# Patient Record
Sex: MALE | Race: WHITE | Employment: FULL TIME | URBAN - METROPOLITAN AREA
[De-identification: names, ages, dates, MRNs, and addresses within clinical notes are randomized per-mention and may not be internally consistent; named-entity substitution may affect disease eponyms.]

---

## 2019-10-31 ENCOUNTER — LAB ENCOUNTER (OUTPATIENT)
Dept: LAB | Age: 34
End: 2019-10-31
Attending: INTERNAL MEDICINE
Payer: COMMERCIAL

## 2019-10-31 ENCOUNTER — OFFICE VISIT (OUTPATIENT)
Dept: INTERNAL MEDICINE CLINIC | Facility: CLINIC | Age: 34
End: 2019-10-31
Payer: COMMERCIAL

## 2019-10-31 VITALS
OXYGEN SATURATION: 99 % | HEART RATE: 80 BPM | WEIGHT: 192 LBS | HEIGHT: 68 IN | SYSTOLIC BLOOD PRESSURE: 118 MMHG | DIASTOLIC BLOOD PRESSURE: 86 MMHG | RESPIRATION RATE: 16 BRPM | BODY MASS INDEX: 29.1 KG/M2 | TEMPERATURE: 98 F

## 2019-10-31 DIAGNOSIS — R10.9 ABDOMINAL PAIN, UNSPECIFIED ABDOMINAL LOCATION: ICD-10-CM

## 2019-10-31 DIAGNOSIS — Z00.00 PHYSICAL EXAM: Primary | ICD-10-CM

## 2019-10-31 DIAGNOSIS — M54.9 CHRONIC BACK PAIN, UNSPECIFIED BACK LOCATION, UNSPECIFIED BACK PAIN LATERALITY: ICD-10-CM

## 2019-10-31 DIAGNOSIS — Z00.00 PHYSICAL EXAM: ICD-10-CM

## 2019-10-31 DIAGNOSIS — G89.29 CHRONIC BACK PAIN, UNSPECIFIED BACK LOCATION, UNSPECIFIED BACK PAIN LATERALITY: ICD-10-CM

## 2019-10-31 PROCEDURE — 80061 LIPID PANEL: CPT

## 2019-10-31 PROCEDURE — 84443 ASSAY THYROID STIM HORMONE: CPT

## 2019-10-31 PROCEDURE — 80053 COMPREHEN METABOLIC PANEL: CPT

## 2019-10-31 PROCEDURE — 90471 IMMUNIZATION ADMIN: CPT | Performed by: INTERNAL MEDICINE

## 2019-10-31 PROCEDURE — 90686 IIV4 VACC NO PRSV 0.5 ML IM: CPT | Performed by: INTERNAL MEDICINE

## 2019-10-31 PROCEDURE — 99385 PREV VISIT NEW AGE 18-39: CPT | Performed by: INTERNAL MEDICINE

## 2019-10-31 PROCEDURE — 81003 URINALYSIS AUTO W/O SCOPE: CPT

## 2019-10-31 PROCEDURE — 85025 COMPLETE CBC W/AUTO DIFF WBC: CPT

## 2019-10-31 PROCEDURE — 36415 COLL VENOUS BLD VENIPUNCTURE: CPT

## 2019-10-31 RX ORDER — DICYCLOMINE HCL 20 MG
20 TABLET ORAL 3 TIMES DAILY PRN
Qty: 60 TABLET | Refills: 1 | Status: SHIPPED | OUTPATIENT
Start: 2019-10-31 | End: 2020-01-15

## 2019-10-31 RX ORDER — FEXOFENADINE HCL AND PSEUDOEPHEDRINE HCI 60; 120 MG/1; MG/1
1 TABLET, EXTENDED RELEASE ORAL DAILY PRN
COMMUNITY

## 2019-10-31 NOTE — PROGRESS NOTES
HPI:    Patient ID: Juliana Harper is a 29year old male. Patient presents for physical.    He has not been seen since November 2016. Prior to that time, patient was having issues of chronic recurrent abdominal pain.   Patient noted significant left-s Alcohol use: Yes        Comment: Occasional      Drug use: No        Review of Systems   Constitutional: Negative for activity change, chills and fever. HENT: Negative for congestion, facial swelling, nosebleeds, rhinorrhea and sinus pain.     Eyes: Nega reactive to light. Conjunctivae and EOM are normal.   Neck: Neck supple. No JVD present. No thyromegaly present. Cardiovascular: Normal rate, regular rhythm and normal heart sounds.    Pulmonary/Chest: Effort normal and breath sounds normal. No respirator strengthening/stretching exercises. We will check routine labs. Patient will get flu shot today. We will arrange follow-up after gastroenterology evaluation.       Orders Placed This Encounter      Urinalysis, Routine [E]      Comp Metabolic Panel

## 2019-11-01 ENCOUNTER — TELEPHONE (OUTPATIENT)
Dept: INTERNAL MEDICINE CLINIC | Facility: CLINIC | Age: 34
End: 2019-11-01

## 2020-01-13 ENCOUNTER — TELEPHONE (OUTPATIENT)
Dept: INTERNAL MEDICINE CLINIC | Facility: CLINIC | Age: 35
End: 2020-01-13

## 2020-01-13 NOTE — TELEPHONE ENCOUNTER
To Dr. Rodrigo Welsh---    Not in records as being prescribed for patient previously. Pended for review.

## 2020-01-15 RX ORDER — DICYCLOMINE HCL 20 MG
20 TABLET ORAL 3 TIMES DAILY PRN
Qty: 60 TABLET | Refills: 1 | Status: SHIPPED | OUTPATIENT
Start: 2020-01-15 | End: 2021-05-04 | Stop reason: ALTCHOICE

## 2020-01-15 NOTE — TELEPHONE ENCOUNTER
Please advise - cuco prince who states he has larisa with GI late February , still has slight abdominal pain and is taking dicyclomine - refill pending thanks

## 2020-02-26 ENCOUNTER — OFFICE VISIT (OUTPATIENT)
Dept: GASTROENTEROLOGY | Facility: CLINIC | Age: 35
End: 2020-02-26
Payer: COMMERCIAL

## 2020-02-26 VITALS
DIASTOLIC BLOOD PRESSURE: 88 MMHG | BODY MASS INDEX: 27.89 KG/M2 | WEIGHT: 184 LBS | HEIGHT: 68 IN | SYSTOLIC BLOOD PRESSURE: 137 MMHG | HEART RATE: 73 BPM

## 2020-02-26 DIAGNOSIS — K59.04 CHRONIC IDIOPATHIC CONSTIPATION: ICD-10-CM

## 2020-02-26 DIAGNOSIS — R10.9 LEFT SIDED ABDOMINAL PAIN: Primary | ICD-10-CM

## 2020-02-26 DIAGNOSIS — R14.0 ABDOMINAL BLOATING: ICD-10-CM

## 2020-02-26 PROCEDURE — 99243 OFF/OP CNSLTJ NEW/EST LOW 30: CPT | Performed by: INTERNAL MEDICINE

## 2020-02-26 RX ORDER — POLYETHYLENE GLYCOL 3350 17 G/17G
17 POWDER, FOR SOLUTION ORAL AS NEEDED
COMMUNITY

## 2020-02-26 NOTE — PROGRESS NOTES
HPI:    Patient ID: Yoshi Griffiths is a fit and healthy 29year old man who is referred by Dr. Daisy Wynne regarding several years of left-sided abdominal pain. As below, a CT scan was performed for this concern back in October 2016.   Dr. Darwin Ortiz notes with domi Normal     KIDNEYS URETERS AND BLADDER: Normal     PELVIC ORGANS: No abnormal pelvic masses     ABDOMINAL AORTA: The aorta is normal in course and caliber.     LYMPH NODES: There is no evidence of mesenteric, retroperitoneal or inguinal adenopathy.     BOW 10/31/2019:  Normal CBC  AST 21 ALT 37 alkaline phosphatase 59  Albumin 4.4  TSH 1.31      Fit and healthy 29year-old gentleman with several years of left-sided abdominal pain and gas, bloating symptom.   Reassuring CT scan for the symptoms late 2016 as ab with dark red lid) - [employer]/website, [employer] Website only, [employer] heaping tablespoon in water or juice once or twice per day; or    -  Benefiber (same) -- artificial/less bloating  -  Citrucel powder or tabs  -  E[employer]'s Tummy Fiber - (internet)

## 2021-05-04 ENCOUNTER — OFFICE VISIT (OUTPATIENT)
Dept: INTERNAL MEDICINE CLINIC | Facility: CLINIC | Age: 36
End: 2021-05-04
Payer: COMMERCIAL

## 2021-05-04 VITALS
BODY MASS INDEX: 26 KG/M2 | WEIGHT: 170 LBS | HEART RATE: 82 BPM | OXYGEN SATURATION: 99 % | DIASTOLIC BLOOD PRESSURE: 76 MMHG | RESPIRATION RATE: 14 BRPM | TEMPERATURE: 98 F | SYSTOLIC BLOOD PRESSURE: 132 MMHG

## 2021-05-04 DIAGNOSIS — K52.9 GASTROENTERITIS: Primary | ICD-10-CM

## 2021-05-04 PROCEDURE — 99213 OFFICE O/P EST LOW 20 MIN: CPT | Performed by: INTERNAL MEDICINE

## 2021-05-04 PROCEDURE — 3078F DIAST BP <80 MM HG: CPT | Performed by: INTERNAL MEDICINE

## 2021-05-04 PROCEDURE — 3075F SYST BP GE 130 - 139MM HG: CPT | Performed by: INTERNAL MEDICINE

## 2021-05-04 NOTE — PROGRESS NOTES
HPI:    Patient ID: Matthew Boxer is a 28year old male. 8 days ago, developed nausea associated with diarrhea and cramps, decreased po intake. Took immodium with some help; however then developed constipation. Covid testing 6 days ago negative.   Jose Rodriguez tenderness or guarding. Musculoskeletal:      Right lower leg: No edema. Left lower leg: No edema. Neurological:      Mental Status: He is alert.    Psychiatric:         Mood and Affect: Mood normal.                ASSESSMENT/PLAN:   Gastroenteriti

## 2022-02-02 ENCOUNTER — TELEPHONE (OUTPATIENT)
Dept: INTERNAL MEDICINE CLINIC | Facility: CLINIC | Age: 37
End: 2022-02-02

## 2022-02-02 NOTE — TELEPHONE ENCOUNTER
Pt reports increased chest congestion, productive and occasional dry cough, dark green mucus, chills, diarrhea that began 3 days ago. Denies SOB at rest. Advised immediate care for evaluation and for any pertinent possible imaging/testing. Pt verbalized understanding and agrees with plan. Pt states he plans to go to [id number] [facility] [place],[address].

## 2022-02-02 NOTE — TELEPHONE ENCOUNTER
Patient calling for appointment today with Dr. Katheryn Mendes. Has cold like symptoms for about three days. Symptoms have been getting worse. Cough up dark green, chest congestion. Cough is dry at times. This morning woke up with chills and hot flashes. This has resolved. Now has diarrhea this morning.   No fever    [facility] in [place]    No known exposure to Covid  Patient is vaccinated    Best call back number [phone]

## 2022-02-03 ENCOUNTER — HOSPITAL ENCOUNTER (OUTPATIENT)
Age: 37
Discharge: HOME | End: 2022-02-03
Payer: COMMERCIAL

## 2022-02-03 VITALS
DIASTOLIC BLOOD PRESSURE: 82 MMHG | RESPIRATION RATE: 16 BRPM | OXYGEN SATURATION: 98 % | BODY MASS INDEX: 25.92 KG/M2 | HEIGHT: 69 IN | TEMPERATURE: 99 F | SYSTOLIC BLOOD PRESSURE: 131 MMHG | HEART RATE: 79 BPM | WEIGHT: 175 LBS

## 2022-02-03 DIAGNOSIS — R05.9 COUGH: ICD-10-CM

## 2022-02-03 DIAGNOSIS — Z20.822 ENCOUNTER FOR LABORATORY TESTING FOR COVID-19 VIRUS: Primary | ICD-10-CM

## 2022-02-03 LAB — SARS-COV-2 RNA RESP QL NAA+PROBE: NOT DETECTED

## 2022-02-03 PROCEDURE — U0002 COVID-19 LAB TEST NON-CDC: HCPCS | Performed by: NURSE PRACTITIONER

## 2022-02-03 PROCEDURE — 99213 OFFICE O/P EST LOW 20 MIN: CPT | Performed by: NURSE PRACTITIONER

## 2022-02-03 RX ORDER — ALBUTEROL SULFATE 90 UG/1
2 AEROSOL, METERED RESPIRATORY (INHALATION) EVERY 4 HOURS PRN
Qty: 1 EACH | Refills: 0 | Status: SHIPPED | OUTPATIENT
Start: 2022-02-03 | End: 2022-03-05

## 2022-02-03 RX ORDER — AZITHROMYCIN 250 MG/1
TABLET, FILM COATED ORAL
Qty: 6 TABLET | Refills: 0 | Status: SHIPPED | OUTPATIENT
Start: 2022-02-03 | End: 2022-02-08

## 2022-02-03 NOTE — ED INITIAL ASSESSMENT (HPI)
Pt presents to the [facility] with c/o a sore throat, cough and chest pressure since last Saturday. No nasal congestion or fevers.

## 2022-02-03 NOTE — TELEPHONE ENCOUNTER
Triage RN following up on UC visit. It appears pt was seen in [facility] this morning and provided with zpak and albuterol inhaler. Nothing further in this encounter.

## 2022-05-05 ENCOUNTER — TELEPHONE (OUTPATIENT)
Dept: INTERNAL MEDICINE CLINIC | Facility: CLINIC | Age: 37
End: 2022-05-05

## 2022-05-05 NOTE — TELEPHONE ENCOUNTER
Please call patient  He tested positive for COVID on Tuesday  Please call patient to discuss/advise  Tasked to nursing

## 2022-05-05 NOTE — TELEPHONE ENCOUNTER
Spoke with patient and he states that he was diagnosed with Covid on Tuesday 5/3. Today patient complains of productive cough and cold sweats (no fever). Patient denies SOB, dyspnea, or CP at this time. Denies any other URI symptoms. Patient notified of current CDC guidelines and advised to call back with new/worsening symptoms or to go to ER if CP or SOB develop. Patient verbalized an understanding to all information. He will call back if he needs a note for work- at this time he does not need one.

## (undated) NOTE — LETTER
[facility], [place]  [address] [place] [address] Marcia Islasdler [id number] [phone]                  5/4/21      To Whom It May Concern:      Alba Figueroa is currently under my medical care and medically incapacitated and un

## (undated) NOTE — LETTER
Date & Time: 2/3/2022, 9:50 AM  Patient: Tereza Pena  Encounter Provider(s):    MATI Galindo       To Whom It May Concern:    Tereza Pena was seen and treated in our department on 2/3/2022. He should not return to work until 2/5/22.     If you have any questions or concerns, please do not hesitate to call.        _____________________________  Physician/APC Signature